# Patient Record
Sex: MALE | Race: WHITE | Employment: UNEMPLOYED
[De-identification: names, ages, dates, MRNs, and addresses within clinical notes are randomized per-mention and may not be internally consistent; named-entity substitution may affect disease eponyms.]

---

## 2023-05-31 ENCOUNTER — HOSPITAL ENCOUNTER (EMERGENCY)
Facility: HOSPITAL | Age: 28
Discharge: HOME | End: 2023-05-31
Attending: EMERGENCY MEDICINE

## 2023-05-31 VITALS
RESPIRATION RATE: 16 BRPM | OXYGEN SATURATION: 98 % | DIASTOLIC BLOOD PRESSURE: 72 MMHG | SYSTOLIC BLOOD PRESSURE: 116 MMHG | HEART RATE: 98 BPM | TEMPERATURE: 98.6 F

## 2023-05-31 DIAGNOSIS — F11.90 OPIATE USE: Primary | ICD-10-CM

## 2023-05-31 PROCEDURE — 99283 EMERGENCY DEPT VISIT LOW MDM: CPT

## 2023-05-31 NOTE — ED PROVIDER NOTES
Tenderness: There is no abdominal tenderness. Musculoskeletal:         General: Normal range of motion. Cervical back: Normal range of motion and neck supple. Skin:     General: Skin is warm and dry. Neurological:      General: No focal deficit present. Mental Status: He is alert. Psychiatric:      Comments: Alert and oriented except to the events of calling EMS, neurologically intact. Diagnostic Study Results   Labs -  No results found for this or any previous visit (from the past 24 hour(s)). Radiologic Studies -   No orders to display     [unfilled]    Medications ordered:   Medications - No data to display      Medical Decision Making   Initial Medical Decision Making and DDx:  At the time my evaluation respirations are 14 and sat is 97% on room air. He has not required any interventions. He is not obtunded or encephalopathic. No cause to hold him against his will and he is asking for discharge at this point so he will be discharged. Declines Narcan. Do not suspect intracranial emergency such as stroke or hemorrhage. ED Course: Progress Notes, Reevaluation, and Consults:         I am the first provider for this patient. I reviewed the vital signs, available nursing notes, past medical history, past surgical history, family history and social history. Patient Vitals for the past 12 hrs:   Temp Pulse Resp BP SpO2   05/31/23 1916 -- -- 16 -- 98 %   05/31/23 1850 98.6 °F (37 °C) 98 10 116/72 (!) 88 %       Vital Signs-Reviewed the patient's vital signs. Pulse Oximetry Analysis, Cardiac Monitor, 12 lead ekg:      Interpreted by the EP. Records Reviewed: Nursing notes reviewed (Time of Review: 7:20 PM)    Procedures:   Critical Care Time: 0  If critical care time is note it is exclusive of any separately billable procedures. Aspirin: (was aspirin given for stroke?)    Diagnosis     Clinical Impression:   1.  Opiate use        Disposition: DISPOSITION Decision To

## 2023-05-31 NOTE — ED TRIAGE NOTES
Pt arrived via EMS after taking a percocet given to him at a gas station. Pt breathing 10 respirations a minute.

## 2023-06-05 ENCOUNTER — HOSPITAL ENCOUNTER (INPATIENT)
Facility: HOSPITAL | Age: 28
LOS: 3 days | Discharge: HOME | DRG: 751 | End: 2023-06-09
Attending: EMERGENCY MEDICINE | Admitting: PSYCHIATRY & NEUROLOGY
Payer: MEDICAID

## 2023-06-05 DIAGNOSIS — R45.851 SUICIDAL IDEATION: Primary | ICD-10-CM

## 2023-06-05 LAB
AMPHET UR QL SCN: NEGATIVE
ANION GAP SERPL CALC-SCNC: 5 MMOL/L (ref 3–18)
APPEARANCE UR: CLEAR
BARBITURATES UR QL SCN: NEGATIVE
BASOPHILS # BLD: 0 K/UL (ref 0–0.1)
BASOPHILS NFR BLD: 0 % (ref 0–2)
BENZODIAZ UR QL: NEGATIVE
BILIRUB UR QL: NEGATIVE
BUN SERPL-MCNC: 8 MG/DL (ref 7–18)
BUN/CREAT SERPL: 9 (ref 12–20)
CALCIUM SERPL-MCNC: 10 MG/DL (ref 8.5–10.1)
CANNABINOIDS UR QL SCN: POSITIVE
CHLORIDE SERPL-SCNC: 102 MMOL/L (ref 100–111)
CO2 SERPL-SCNC: 30 MMOL/L (ref 21–32)
COCAINE UR QL SCN: POSITIVE
COLOR UR: YELLOW
CREAT SERPL-MCNC: 0.89 MG/DL (ref 0.6–1.3)
DIFFERENTIAL METHOD BLD: NORMAL
EOSINOPHIL # BLD: 0 K/UL (ref 0–0.4)
EOSINOPHIL NFR BLD: 0 % (ref 0–5)
ERYTHROCYTE [DISTWIDTH] IN BLOOD BY AUTOMATED COUNT: 12 % (ref 11.6–14.5)
ETHANOL SERPL-MCNC: <3 MG/DL (ref 0–3)
FLUAV RNA SPEC QL NAA+PROBE: NOT DETECTED
FLUBV RNA SPEC QL NAA+PROBE: NOT DETECTED
GLUCOSE SERPL-MCNC: 99 MG/DL (ref 74–99)
GLUCOSE UR STRIP.AUTO-MCNC: NEGATIVE MG/DL
HCT VFR BLD AUTO: 47.6 % (ref 36–48)
HGB BLD-MCNC: 15.9 G/DL (ref 13–16)
HGB UR QL STRIP: NEGATIVE
IMM GRANULOCYTES # BLD AUTO: 0 K/UL (ref 0–0.04)
IMM GRANULOCYTES NFR BLD AUTO: 0 % (ref 0–0.5)
KETONES UR QL STRIP.AUTO: ABNORMAL MG/DL
LEUKOCYTE ESTERASE UR QL STRIP.AUTO: NEGATIVE
LYMPHOCYTES # BLD: 2.1 K/UL (ref 0.9–3.6)
LYMPHOCYTES NFR BLD: 28 % (ref 21–52)
Lab: ABNORMAL
MCH RBC QN AUTO: 31.2 PG (ref 24–34)
MCHC RBC AUTO-ENTMCNC: 33.4 G/DL (ref 31–37)
MCV RBC AUTO: 93.3 FL (ref 78–100)
METHADONE UR QL: NEGATIVE
MONOCYTES # BLD: 0.5 K/UL (ref 0.05–1.2)
MONOCYTES NFR BLD: 7 % (ref 3–10)
NEUTS SEG # BLD: 4.9 K/UL (ref 1.8–8)
NEUTS SEG NFR BLD: 65 % (ref 40–73)
NITRITE UR QL STRIP.AUTO: NEGATIVE
NRBC # BLD: 0 K/UL (ref 0–0.01)
NRBC BLD-RTO: 0 PER 100 WBC
OPIATES UR QL: NEGATIVE
PCP UR QL: NEGATIVE
PH UR STRIP: 8 (ref 5–8)
PLATELET # BLD AUTO: 360 K/UL (ref 135–420)
PMV BLD AUTO: 10.1 FL (ref 9.2–11.8)
POTASSIUM SERPL-SCNC: 3.6 MMOL/L (ref 3.5–5.5)
PROT UR STRIP-MCNC: NEGATIVE MG/DL
RBC # BLD AUTO: 5.1 M/UL (ref 4.35–5.65)
SARS-COV-2 RNA RESP QL NAA+PROBE: NOT DETECTED
SODIUM SERPL-SCNC: 137 MMOL/L (ref 136–145)
SP GR UR REFRACTOMETRY: 1.02 (ref 1–1.03)
UROBILINOGEN UR QL STRIP.AUTO: 0.2 EU/DL (ref 0.2–1)
WBC # BLD AUTO: 7.6 K/UL (ref 4.6–13.2)

## 2023-06-05 PROCEDURE — 85025 COMPLETE CBC W/AUTO DIFF WBC: CPT

## 2023-06-05 PROCEDURE — 80048 BASIC METABOLIC PNL TOTAL CA: CPT

## 2023-06-05 PROCEDURE — 82077 ASSAY SPEC XCP UR&BREATH IA: CPT

## 2023-06-05 PROCEDURE — 81003 URINALYSIS AUTO W/O SCOPE: CPT

## 2023-06-05 PROCEDURE — 99285 EMERGENCY DEPT VISIT HI MDM: CPT

## 2023-06-05 PROCEDURE — 87636 SARSCOV2 & INF A&B AMP PRB: CPT

## 2023-06-05 PROCEDURE — 80307 DRUG TEST PRSMV CHEM ANLYZR: CPT

## 2023-06-05 ASSESSMENT — PAIN - FUNCTIONAL ASSESSMENT: PAIN_FUNCTIONAL_ASSESSMENT: NONE - DENIES PAIN

## 2023-06-05 ASSESSMENT — ENCOUNTER SYMPTOMS
RESPIRATORY NEGATIVE: 1
ALLERGIC/IMMUNOLOGIC NEGATIVE: 1
EYES NEGATIVE: 1
GASTROINTESTINAL NEGATIVE: 1

## 2023-06-06 PROBLEM — F32.A DEPRESSION: Status: ACTIVE | Noted: 2023-06-06

## 2023-06-06 PROBLEM — F98.8 ADD (ATTENTION DEFICIT DISORDER) WITHOUT HYPERACTIVITY: Status: ACTIVE | Noted: 2023-06-06

## 2023-06-06 PROBLEM — F32.A DEPRESSION: Status: RESOLVED | Noted: 2023-06-06 | Resolved: 2023-06-06

## 2023-06-06 PROBLEM — F33.2 SEVERE RECURRENT MAJOR DEPRESSION WITHOUT PSYCHOTIC FEATURES (HCC): Status: ACTIVE | Noted: 2023-06-06

## 2023-06-06 LAB
EKG ATRIAL RATE: 64 BPM
EKG DIAGNOSIS: NORMAL
EKG P AXIS: 41 DEGREES
EKG P-R INTERVAL: 130 MS
EKG Q-T INTERVAL: 394 MS
EKG QRS DURATION: 96 MS
EKG QTC CALCULATION (BAZETT): 406 MS
EKG R AXIS: 32 DEGREES
EKG T AXIS: 14 DEGREES
EKG VENTRICULAR RATE: 64 BPM

## 2023-06-06 PROCEDURE — 6370000000 HC RX 637 (ALT 250 FOR IP): Performed by: PSYCHIATRY & NEUROLOGY

## 2023-06-06 PROCEDURE — 1240000000 HC EMOTIONAL WELLNESS R&B

## 2023-06-06 PROCEDURE — 99222 1ST HOSP IP/OBS MODERATE 55: CPT | Performed by: PSYCHIATRY & NEUROLOGY

## 2023-06-06 PROCEDURE — 93005 ELECTROCARDIOGRAM TRACING: CPT | Performed by: EMERGENCY MEDICINE

## 2023-06-06 PROCEDURE — 93010 ELECTROCARDIOGRAM REPORT: CPT | Performed by: INTERNAL MEDICINE

## 2023-06-06 RX ORDER — TRAZODONE HYDROCHLORIDE 50 MG/1
50 TABLET ORAL NIGHTLY PRN
Status: DISCONTINUED | OUTPATIENT
Start: 2023-06-06 | End: 2023-06-09 | Stop reason: HOSPADM

## 2023-06-06 RX ORDER — HYDROXYZINE PAMOATE 50 MG/1
50 CAPSULE ORAL 3 TIMES DAILY PRN
Status: DISCONTINUED | OUTPATIENT
Start: 2023-06-06 | End: 2023-06-09 | Stop reason: HOSPADM

## 2023-06-06 RX ORDER — BUPROPION HYDROCHLORIDE 150 MG/1
150 TABLET ORAL DAILY
Status: DISCONTINUED | OUTPATIENT
Start: 2023-06-07 | End: 2023-06-08

## 2023-06-06 RX ORDER — BENZTROPINE MESYLATE 1 MG/ML
1 INJECTION INTRAMUSCULAR; INTRAVENOUS 2 TIMES DAILY PRN
Status: CANCELLED | OUTPATIENT
Start: 2023-06-06

## 2023-06-06 RX ORDER — BUPROPION HYDROCHLORIDE 150 MG/1
300 TABLET ORAL DAILY
Status: DISCONTINUED | OUTPATIENT
Start: 2023-06-06 | End: 2023-06-06

## 2023-06-06 RX ORDER — HALOPERIDOL 5 MG/1
5 TABLET ORAL EVERY 6 HOURS PRN
Status: CANCELLED | OUTPATIENT
Start: 2023-06-06

## 2023-06-06 RX ORDER — LORAZEPAM 2 MG/ML
INJECTION INTRAMUSCULAR EVERY 6 HOURS PRN
Status: CANCELLED | OUTPATIENT
Start: 2023-06-06

## 2023-06-06 RX ORDER — LORAZEPAM 0.5 MG/1
0.5 TABLET ORAL EVERY 4 HOURS PRN
Status: CANCELLED | OUTPATIENT
Start: 2023-06-06

## 2023-06-06 RX ORDER — BENZTROPINE MESYLATE 1 MG/1
1 TABLET ORAL 2 TIMES DAILY
Status: CANCELLED | OUTPATIENT
Start: 2023-06-06

## 2023-06-06 RX ORDER — HALOPERIDOL 5 MG/ML
INJECTION INTRAMUSCULAR EVERY 6 HOURS PRN
Status: CANCELLED | OUTPATIENT
Start: 2023-06-06

## 2023-06-06 RX ADMIN — BUPROPION HYDROCHLORIDE 300 MG: 150 TABLET, FILM COATED, EXTENDED RELEASE ORAL at 09:07

## 2023-06-06 RX ADMIN — HYDROXYZINE PAMOATE 50 MG: 50 CAPSULE ORAL at 03:42

## 2023-06-06 RX ADMIN — TRAZODONE HYDROCHLORIDE 50 MG: 50 TABLET ORAL at 20:38

## 2023-06-06 ASSESSMENT — SLEEP AND FATIGUE QUESTIONNAIRES
DO YOU HAVE DIFFICULTY SLEEPING: YES
AVERAGE NUMBER OF SLEEP HOURS: 6
DO YOU USE A SLEEP AID: YES
SLEEP PATTERN: DIFFICULTY FALLING ASLEEP

## 2023-06-06 ASSESSMENT — LIFESTYLE VARIABLES
HOW MANY STANDARD DRINKS CONTAINING ALCOHOL DO YOU HAVE ON A TYPICAL DAY: PATIENT DOES NOT DRINK
HOW OFTEN DO YOU HAVE A DRINK CONTAINING ALCOHOL: NEVER

## 2023-06-06 NOTE — H&P
Deferred. AXIS III:  Remote history of intravenous drug use. TREATMENT PLAN:  1. The patient was admitted to the ann, a locked unit here in the behavioral program, will be seen daily and will be referred to the groups within context of the program.  2.  The patient will be prescribed with a combination of medications which will include bupropion  mg initially for 3 or 4 days, then to be increased to 300 mg daily which is his previously prescribed dose. 3.  The patient will be prescribed with Vistaril for agitation or anxiety. 4.  From a treatment point of view, the patient will be considered for a referral to outpatient treatment. We will possibly consider also referral for an inpatient rehab program.  However, it depends upon how the patient does during this admission. ESTIMATED LENGTH OF STAY AND PROGNOSIS:  ELOS is 5-7 days. Prognosis will entirely depend upon the patient's treatment compliance. Shahla Juarez MD, LFAPA      [id number]  D:  06/06/2023 11:52  T:  06/06/2023 13:33  JOB #:  [id number]    Behavioral Services  Medicare Certification Upon Admission    I certify that this patient's inpatient psychiatric hospital admission is medically necessary for:      [x] Treatment which could reasonably be expected to improve this patient's condition,       [] For diagnostic study;     AND     [x] The inpatient psychiatric services are provided while the individual is under the care of a physician and are included in the individualized plan of care. Estimated length of stay/service is 5 days. Plan for post-hospital care sill include further OP psychiatric and medical care.     Electronically signed by Shahla Juarez MD on 6/7/2023 at 8:37 AM

## 2023-06-06 NOTE — ED TRIAGE NOTES
Pt very positive attitude. Stated he has been an IV drug user for 15 years, showed tech where to get blood work and was positive the entire time.

## 2023-06-06 NOTE — GROUP NOTE
Art Therapy Group Progress Note    PATIENT SCHEDULED FOR GROUP AT:  2:00 PM    GROUP STOP TIME:  3:00 PM    ATTENDANCE: Low (3/5) participants     TOPIC / FOCUS: Draw an Emotion as a Character     GOALS:  Emotion Identification, modeling emotional communication skills, reflect on emotions and reactions to emotions, identify coping skills and provide alternative coping options     PARTICIPATION LEVEL: Lee 621  Art Therapist, MA

## 2023-06-06 NOTE — PLAN OF CARE
Problem: Discharge Planning  Goal: Discharge to home or other facility with appropriate resources  6/6/2023 1614 by Adriana Adorno RN  Outcome: Progressing     Problem: Self Harm/Suicidality  Goal: Will have no self-injury during hospital stay  Description: INTERVENTIONS:  1. Ensure constant observer at bedside with Q15M safety checks  2. Maintain a safe environment  3. Secure patient belongings  4. Ensure family/visitors adhere to safety recommendations  5. Ensure safety tray has been added to patient's diet order  6. Every shift and PRN: Re-assess suicidal risk via Frequent Screener    6/6/2023 1610 by Adriana Adorno RN  Outcome: Progressing     Problem: Safety - Adult  Goal: Free from fall injury  6/6/2023 1614 by Adriana Adorno RN  Outcome: Progressing   Patient presents with a flat affect and depressed mood. Patient denies SI/HI and AVH. Patient denies pain. Patient remained isolative to room his room this shift. Patient is encouraged to participate in unit activities. This writer will continue to provide a safe and therapeutic environment.

## 2023-06-06 NOTE — ED PROVIDER NOTES
Psychiatric:         Mood and Affect: Mood normal.         Behavior: Behavior normal.         Thought Content: Thought content normal.         Judgment: Judgment normal.       DIAGNOSTIC RESULTS   LABS:  Labs Reviewed   URINALYSIS - Abnormal; Notable for the following components:       Result Value    Ketones, Urine TRACE (*)     All other components within normal limits   URINE DRUG SCREEN - Abnormal; Notable for the following components:    THC, TH-Cannabinol, Urine Positive (*)     Cocaine, Urine Positive (*)     All other components within normal limits   BASIC METABOLIC PANEL - Abnormal; Notable for the following components:    Bun/Cre Ratio 9 (*)     All other components within normal limits   COVID-19 & INFLUENZA COMBO   CBC WITH AUTO DIFFERENTIAL   ETHANOL       All other labs were within normal range or not returned as of this dictation. EMERGENCY DEPARTMENT COURSE and DIFFERENTIAL DIAGNOSIS/MDM:   Vitals:    Vitals:    06/05/23 2153   BP: (!) 138/97   Pulse: 72   Resp: 16   Temp: 98.5 °F (36.9 °C)   TempSrc: Oral   SpO2: 98%   Weight: 165 lb (74.8 kg)   Height: 5' 8\" (1.727 m)       Is been medically cleared to present to crisis. During evaluation, patient states that he is suicidal to crisis worker and she is presenting to psychiatrist patient for admission. Medical Decision Making  Amount and/or Complexity of Data Reviewed  Labs: ordered. ECG/medicine tests: ordered. Risk  Decision regarding hospitalization. REASSESSMENT          FINAL IMPRESSION    No diagnosis found. DISPOSITION/PLAN   DISPOSITION        PATIENT REFERRED TO:  No follow-up provider specified. DISCHARGE MEDICATIONS:  New Prescriptions    No medications on file     Controlled Substances Monitoring:     No flowsheet data found.     (Please note that portions of this note were completed with a voice recognition program.  Efforts were made to edit the dictations but occasionally words are

## 2023-06-06 NOTE — CONSULTS
SUBJECTIVE:  Silvano De León is a 81 year old male who presents to the clinic for evaluation of cerumen impaction.    OBJECTIVE:  --Ear exam was done under the microscope.    Patient was placed in a supine position, and a microscope was then brought into the field. A speculum was placed in the right ear. Cerumen in the ear canal partially occluding the canal and unable to completely visualized ear canal and TM.  Using an alligator forcep, and a #5 suction tip, the cerumen was removed. In a similar fashion, a speculum was inserted into the left ear canal.  Cerumen in the ear canal partially occluding the canal and unable to completely visualized ear canal and TM.  The cerumen was removed using an alligator forcep, and a #5 suction tip.  Ears:    Right ear: EAC patent, tympanic membrane intact, no middle ear effusion, no otorrhea  Left ear: EAC patent, tympanic membrane intact, no middle ear effusion, no otorrhea    ASSESSMENT:  Cerumen impaction    PLAN:  -cerumen removed  -f/u as needed    Electronically signed by: Freddy Soria PA-C, 05/28/19    Supervising Physician: :Isiah Hamlin MD    
Negative NEG      Blood, Urine Negative NEG      Urobilinogen, Urine 0.2 0.2 - 1.0 EU/dL    Nitrite, Urine Negative NEG      Leukocyte Esterase, Urine Negative NEG     DRUG SCREEN MULTI URINE    Collection Time: 06/05/23 10:00 PM   Result Value Ref Range    Benzodiazepines, Urine Negative NEG      Barbiturates, Urine Negative NEG      THC, TH-Cannabinol, Urine Positive (A) NEG      Opiates, Urine Negative NEG      PCP, Urine Negative NEG      Cocaine, Urine Positive (A) NEG      Amphetamine, Urine Negative NEG      Methadone, Urine Negative NEG      Comments: (NOTE)    CBC with Auto Differential    Collection Time: 06/05/23 10:05 PM   Result Value Ref Range    WBC 7.6 4.6 - 13.2 K/uL    RBC 5.10 4.35 - 5.65 M/uL    Hemoglobin 15.9 13.0 - 16.0 g/dL    Hematocrit 47.6 36.0 - 48.0 %    MCV 93.3 78.0 - 100.0 FL    MCH 31.2 24.0 - 34.0 PG    MCHC 33.4 31.0 - 37.0 g/dL    RDW 12.0 11.6 - 14.5 %    Platelets 277 245 - 443 K/uL    MPV 10.1 9.2 - 11.8 FL    Nucleated RBCs 0.0 0  WBC    nRBC 0.00 0.00 - 0.01 K/uL    Neutrophils % 65 40 - 73 %    Lymphocytes % 28 21 - 52 %    Monocytes % 7 3 - 10 %    Eosinophils % 0 0 - 5 %    Basophils % 0 0 - 2 %    Immature Granulocytes 0 0.0 - 0.5 %    Neutrophils Absolute 4.9 1.8 - 8.0 K/UL    Lymphocytes Absolute 2.1 0.9 - 3.6 K/UL    Monocytes Absolute 0.5 0.05 - 1.2 K/UL    Eosinophils Absolute 0.0 0.0 - 0.4 K/UL    Basophils Absolute 0.0 0.0 - 0.1 K/UL    Absolute Immature Granulocyte 0.0 0.00 - 0.04 K/UL    Differential Type AUTOMATED     Ethanol    Collection Time: 06/05/23 10:05 PM   Result Value Ref Range    Ethanol Lvl <3 0 - 3 MG/DL   Basic Metabolic Panel    Collection Time: 06/05/23 10:05 PM   Result Value Ref Range    Sodium 137 136 - 145 mmol/L    Potassium 3.6 3.5 - 5.5 mmol/L    Chloride 102 100 - 111 mmol/L    CO2 30 21 - 32 mmol/L    Anion Gap 5 3.0 - 18 mmol/L    Glucose 99 74 - 99 mg/dL    BUN 8 7.0 - 18 MG/DL    Creatinine 0.89 0.6 - 1.3 MG/DL    Bun/Cre Ratio

## 2023-06-06 NOTE — GROUP NOTE
Group Therapy Note    Date: 6/6/2023    Group Start Time: 1500  Group End Time: 3125  Group Topic: Recreational      Shreyas Diallo        Group Therapy Note  Attendees/Participation Level: Declined         Group: Table Game/ Trouble      The focus of the group is to release positive endorphins and improve mood.            Signature:  Shar Khan

## 2023-06-06 NOTE — GROUP NOTE
Art Therapy Group Progress Note    PATIENT SCHEDULED FOR GROUP AT: 10:20 AM     GROUP STOP TIME:  11:20 AM    ATTENDANCE:  Low (5/13 participants)     TOPIC / FOCUS: Finish the Image     GOALS:  to practice observation and grounding techniques, practice mindfulness, decrease stress and anxiety, increase reality orientation      PARTICIPATION LEVEL: interactive, attended group, participated in art making, contributed to group discussion     PARTICIPATION QUALITY: appropriate, lethargic - pt stated he had a late night in the ED and admission had impacted his sleep. ATTENTION LEVEL:  invested in coloring     LEVEL OF CONCIOUSNESS: alert, oriented, attentive     SPEECH: normal     THOUGHT PROCESS/ CONTENT:  linear     AFFECTIVE FUNCTIONING: flat     MOOD: depressed, tired     SYMBOLIC & THEMATIC CONTENT AS NOTED IN IMAGERY:  Pt presented with low frustration tolerance. PT chose a shoe to finish the image, but after a minute changed his mind and chose an easier option to complete, a rainbow. The PT's rainbow art lacked color and was created with low energy. Pt artwork congruent with depressed, lethargic presentation. The Art Therapist encouraged the PT to attempt the shoe image. The PT did not attempt to finish the shoe image, but he did color it. The PT was focused and invested in coloring the shoe. Pt coloring style suggested the Pt is experiencing anxiety. STATUS AFTER INTERVENTION: improved, feeling relaxed and focused.      RESPONSE TO LEARNING: able to verbalize current knowledge/ experience    ENDINGS: none reported     Levon More  Art Therapist, MA

## 2023-06-06 NOTE — PLAN OF CARE
Problem: Discharge Planning  Goal: Discharge to home or other facility with appropriate resources  6/6/2023 0414 by Rick Garces RN  Outcome: Progressing  6/6/2023 0414 by Rick Garces RN  Outcome: Progressing  6/6/2023 0413 by Rick Garces RN  Outcome: Progressing  6/6/2023 0412 by Rick Garces RN  Outcome: Progressing     Problem: Self Harm/Suicidality  Goal: Will have no self-injury during hospital stay  6/6/2023 0414 by Rick Garces RN  Outcome: Progressing  6/6/2023 0414 by Rick Garces RN  Outcome: Progressing  6/6/2023 0413 by Rick Garces RN  Outcome: Progressing  6/6/2023 0412 by Rick Garces RN  Flowsheets (Taken 6/6/2023 4240)  Will have no self-injury during hospital stay:   Maintain a safe environment   Secure patient belongings   Every shift and PRN: Re-assess suicidal risk via Frequent Screener

## 2023-06-07 PROCEDURE — 99232 SBSQ HOSP IP/OBS MODERATE 35: CPT | Performed by: PSYCHIATRY & NEUROLOGY

## 2023-06-07 PROCEDURE — 6370000000 HC RX 637 (ALT 250 FOR IP): Performed by: PSYCHIATRY & NEUROLOGY

## 2023-06-07 PROCEDURE — 1240000000 HC EMOTIONAL WELLNESS R&B

## 2023-06-07 RX ORDER — NICOTINE 21 MG/24HR
1 PATCH, TRANSDERMAL 24 HOURS TRANSDERMAL DAILY
Status: DISCONTINUED | OUTPATIENT
Start: 2023-06-07 | End: 2023-06-09 | Stop reason: HOSPADM

## 2023-06-07 RX ADMIN — TRAZODONE HYDROCHLORIDE 50 MG: 50 TABLET ORAL at 20:13

## 2023-06-07 RX ADMIN — BUPROPION HYDROCHLORIDE 150 MG: 150 TABLET, EXTENDED RELEASE ORAL at 08:48

## 2023-06-07 RX ADMIN — HYDROXYZINE PAMOATE 50 MG: 50 CAPSULE ORAL at 17:41

## 2023-06-07 NOTE — BH NOTE
Bedside and Verbal shift change report given to this writer (oncoming nurse) by Marni Lovell RN (offgoing nurse). Report included the following information MAR and Recent Results. Pt came out of the bathroom during the initial rounding. He is alert and oriented to x3. No concern is noted at this time; we will continue with the 15-minute rounding per protocol.

## 2023-06-07 NOTE — BH NOTE
Destiny Sprague was paged regarding the PT request for nicotine patch; on call, MD returned the call immediately and updated with the PT request and order received. Pt is visible in the millieu watching TV with peers. He is alert, oriented to his own abilities, and pleasant upon approach; there are no significant changes from yesterday. He denies any intention of harming himself or others.  Will monitor

## 2023-06-07 NOTE — GROUP NOTE
Art Therapy Group Progress Note    PATIENT SCHEDULED FOR GROUP AT: 1:00 PM    GROUP STOP TIME:  2:00 PM     ATTENDANCE: Moderate (5/10 participants)     TOPIC / FOCUS: I AM' statement Thumbprint Drawing     GOALS:  focus attention, practice mindfulness, practice effective communication, explore identity      PARTICIPATION LEVEL:  minimal, attended group, participated in art making, cooperative, followed directions     PARTICIPATION QUALITY: distracted, socializing     ATTENTION LEVEL: not invested    LEVEL OF CONCIOUSNESS: alert, oriented     SPEECH: normal     THOUGHT PROCESS/ CONTENT: linear     AFFECTIVE FUNCTIONING: flat     MOOD: depressed     SYMBOLIC & THEMATIC CONTENT AS NOTED IN IMAGERY:  PT was able to identify 3 \"I Am\" statements including \"I am Proud\", \"I am Positive\", and \"I am a . \" Pt was encourage to add more I am statements or use color, symbols, or shapes to add more information into his work. PT artwork was low energy. Pt was distracted and socializing.      STATUS AFTER INTERVENTION: unchanged     RESPONSE TO LEARNING: resistant     ENDINGS: none reported     MODES OF INTERVENTION: education, socialization, media    DISIPLINE RESPONSIBLE: Art Therapist     Ela Zavaleta  Art Therapist, MA

## 2023-06-07 NOTE — BH NOTE
Patient appears to have slept for approximately 7 hours. Staff will continue to monitor and document any changes in behavior.

## 2023-06-07 NOTE — GROUP NOTE
Group Therapy Note    Date: 6/7/2023    Group Start Time: 1500  Group End Time: 3264  Group Topic: Recreational        Shreyas Diallo        Group Therapy Note    Attendees: 6    Group Type:  Game/  Emotion Word Scramble and Coping Word Scramble         The focus of the game/activity is to help the patients understand emotions, redirect thoughts to happy/fun moments, and discussing different ways to cope. Notes: The facilitator engaged with PT's a word game. While unscrambling the words for emotions, the facilitator would ask the patients different questions to help think about the emotions while also allowing the PT's to explain a time the emotion was felt. While unscrambling coping words PT's expressed the different leisure activities that would help some cope. Facilitator would also get PT's to come up with words for peers to unscramble. Status After Intervention:  Unchanged    Participation Level:  Active Listener and Interactive     Participation Quality: Appropriate, Attentive, and Sharing      Speech:  normal      Thought Process/Content: Logical      Affective Functioning: Congruent      Mood: interests, laughing, smiling      Level of consciousness:  Alert and Attentive      Response to Learning: Able to verbalize current knowledge/experience and Able to verbalize/acknowledge new learning      Endings: None Reported    Modes of Intervention: Socialization and Problem-solving      Discipline Responsible: Recreational Therapist    Clark Miller  Recreational therapist

## 2023-06-07 NOTE — GROUP NOTE
Group Therapy Note    Date: 6/7/2023    Group Start Time: 1400  Group End Time: 0693  Group Topic: Psychoeducation    [facility] ADULT CHEM [address] [facility], BSW        Group Therapy Note    Attendees: 8    Notes: The focus of this group was to identify and promote life skills. The goal of the group was to understand the value of money, how we use it, and to identify strategies on how to save money. Participants were encouraged to look at money as a tool to successful living. Each participant created a savings and spending guide to help with their monthly spending. Status After Intervention:  Improved    Participation Level:  Active Listener and Interactive    Participation Quality: Appropriate, Attentive, Sharing, and Supportive      Speech:  normal      Thought Process/Content: Logical      Affective Functioning: Congruent    Level of consciousness:  Alert      Response to Learning: Able to verbalize current knowledge/experience, Able to verbalize/acknowledge new learning, Able to retain information, and Capable of insight      Endings: None Reported    Modes of Intervention: Education, Support, Socialization, and Problem-solving      Discipline Responsible: BSW      Signature:  LUZ ELENA Church

## 2023-06-07 NOTE — BH NOTE
06/06/23 7p-7a  Patient visible on unit. Isolative to self. Denies SI/HI/AVH. Endorses depression and mild anxiety, Denies withdrawal symptoms. No complaints of pain. Q15MIN safety checks  maintained.

## 2023-06-08 PROCEDURE — 6370000000 HC RX 637 (ALT 250 FOR IP): Performed by: PSYCHIATRY & NEUROLOGY

## 2023-06-08 PROCEDURE — 1240000000 HC EMOTIONAL WELLNESS R&B

## 2023-06-08 RX ORDER — BUPROPION HYDROCHLORIDE 150 MG/1
300 TABLET ORAL DAILY
Status: DISCONTINUED | OUTPATIENT
Start: 2023-06-09 | End: 2023-06-09 | Stop reason: HOSPADM

## 2023-06-08 RX ADMIN — HYDROXYZINE PAMOATE 50 MG: 50 CAPSULE ORAL at 10:41

## 2023-06-08 RX ADMIN — BUPROPION HYDROCHLORIDE 150 MG: 150 TABLET, EXTENDED RELEASE ORAL at 08:09

## 2023-06-08 RX ADMIN — HYDROXYZINE PAMOATE 50 MG: 50 CAPSULE ORAL at 16:26

## 2023-06-08 RX ADMIN — TRAZODONE HYDROCHLORIDE 50 MG: 50 TABLET ORAL at 20:05

## 2023-06-08 NOTE — GROUP NOTE
Group Therapy Note     Date: 6/8/2023     Group Start Time: 0730  Group End Time: 0740  Group Topic: Community Meeting      [facility] [address], RN        Group Therapy Note     Attendees: 6        Patient's Goal: start up day and re-orientation to unit rules and regulations     Notes:  Pt attended group, was calm and cooperative. Status After Intervention:  Improved     Participation Level:  Active Listener     Participation Quality: Appropriate        Speech:  n/a        Thought Process/Content: Logical        Affective Functioning: Congruent        Mood: euthymic        Level of consciousness:  Alert and Oriented x4        Response to Learning: Capable of insight        Endings: None Reported     Modes of Intervention: Education        Discipline Responsible: Registered Nurse        Signature:  Linus Morales RN

## 2023-06-08 NOTE — GROUP NOTE
Group Therapy Note    Date: 6/8/2023    Group Start Time: 8150  Group End Time: 3461  Group Topic: Recreational        Shreyas Diallo        Group Therapy Note    Attendees: 9  Group: Juan Luis    The focus of this social group is to positive impact on a person's health and wellbeing, including decreasing stress, depression, and anxiety         Status After Intervention:  Unchanged    Participation Level:  Active Listener and Interactive    Participation Quality: Appropriate and Attentive      Speech:  normal      Thought Process/Content: Logical      Affective Functioning: Congruent      Mood: Excited/ Happy      Level of consciousness:  Alert and Attentive          Modes of Intervention: Activity      Discipline Responsible: Recreational Therapist      Signature:  Chris Salmeron

## 2023-06-08 NOTE — BH NOTE
PT appeared to have slept 9hrs thus far, and is currently still sleeping. Will continue to monitor for any changes.

## 2023-06-08 NOTE — PLAN OF CARE
Problem: Self Harm/Suicidality  Goal: Will have no self-injury during hospital stay  Description: INTERVENTIONS:  1. Ensure constant observer at bedside with Q15M safety checks  2. Maintain a safe environment  3. Secure patient belongings  4. Ensure family/visitors adhere to safety recommendations  5. Ensure safety tray has been added to patient's diet order  6. Every shift and PRN: Re-assess suicidal risk via Frequent Screener    Outcome: Progressing    Patient received this am alert and verbal no c/o pain, took am medications, patient stated, Ruth Dalton was feeling better and that the medications are helping him,\" denies SI, HI or auditory/visual hallucinations, patient attended social group this am, will continue to monitor patient.

## 2023-06-08 NOTE — BSMART NOTE
Behavioral Health Crisis Assessment    Chief Complaint: \"I need to get back on my medicine. I'm having suicidal thoughts and feeling depressed. \"     Voluntary or Involuntary Status : Voluntary    C-SSRS current suicide Risk (High, Moderate, Low): Low     Past Suicide Attempts:  (specify): Patient denied past suicide attempts. Self Injurious/Self Mutilation behaviors (specify): Patient denied self-injurious behavior. Protective Factors (specify): \"Supportive family members, I got my freedom. mother Castellon, [phone]. \"    Risk Factors (specify): Suicidal thoughts, no outpatient resources, substance abuse. Substance use (current or past) : (see below)    Alcohol: Patient denied use. Heroin:  Date started: \"Age 14\"  Frequency: \"I haven't used heroin since 2017. \"  Amount:\"2-3 bundles\"  Route: \"IV\"  Last use: \"2017\"  Withdrawals: \"None\"  Rehab/Inpatient Services: \"[facility] [address] [facility]\"    Cocaine:   Date started: \"Age 14\"  Route: \"smoked\"  Frequency: \"relapsed after being clean 2-3 years\"\"  Amount: \"$10-$20 worth\"  Last use: \"2 days, ago\"  Withdrawals: \"None\"  Rehab/Inpatient Services: \"[facility]\"    Marijuana:  Date started: \"Age 11\"  Frequency: \"all the time\"  Amount: \"2-3 bowls\"  Last use: \"yesterday\"  Withdrawals: \"none\"  Rehab/Inpatient Services:\"none\"    Prescription/OTC Medications: Patient denied current use. \"Years ago, I used to do Dilaudid, hydrocodone, OxyContin, and Percocet. \"    Hallucinogenics: Patient denied use. Cigarettes/Cigars: \"I vape nicotine. \"    MH & Substance use Treatment  (current and/or past): \"[facility], [facility], no outpatient [facility] services\"    Medications: \"Wellbutrin 300 mg daily, Vistaril 50 mg in the morning and at night. \"    Violence towards others (current and/or past:(specify): Patient denied.     Legal Issues (current or past}: Patient stated that he has
Pt is a 70-year-old male with history of Major depressive disorder, recurrent, without psychotic symptoms. Substance use disorder including opioids, currently in remission; cocaine - moderate; cannabis - moderate. Pt. Was admitted for depression and suicidal ideations. P.t's case was discussed in staffing. Pt is making progress in treatment. Pt will be dc tomorrow. SW Contact:  SW met with the pt.  to discuss dc planning. \" I feel better\". Pt endorsed not having ideations to harm self and others. SW discussed safety plan,continued treatment compliance,relapse prevention and aftercare. Pt. Denies ideations and hallucinations. Pt. Plans to follow up outpt with [facility]. Pt plan to return to his home address. Pt's insight and mood have improved. SW will continue to provide pt with support towards dc planning.        Pauline Herrmann MA,LMHP-R
Pt is a 70-year-old male with history of Major depressive disorder, recurrent, without psychotic symptoms. Substance use disorder including opioids, currently in remission; cocaine - moderate; cannabis - moderate. Pt. Was admitted for depression and suicidal ideations. Pt.s' case was discussed in treatment team and staffing. Pt is making progress. Pt. was provided medication education. Pt was encouraged to complete his lab work. Pt. Is depressed and mood and insight are starting to improve. The team will continue to engage pt with safety plan, encouraged group attendance and assist with dc planning. Pt  denies ideations and hallucinations. Pt. plans at dc to return to his current home. SW will continue to provide pt with support towards dc planning.          Doc Jose Miguel ERIC,LMJOSE LUIS-R
SOCIAL WORK GROUP THERAPY PROGRESS NOTE    Group Time:  10:15am       Group Topic:  Coping Skills    C D Issues    Group Participation:      Pt moderately involved during group discussion but remained attentive. Not as dysphoric & still seems committed to tx program for CD Issues. Explored \"my body's\" anger warning signs as well as common triggers. Active discussing triggers that impact his moods.     Differences between Assertive, Aggressive & Non-Assertive Behaviors
Social Work Group  6/6/23   Communication Styles Group     Attendance  refused   participate     Number of participants  8   Time in  12:00pm    Time out  12:45pm    Total Time  45 mins    Interaction  absent   Interventions/techniques  Informed and encouraged, Provided positive feedback and support        BONY Aggarwal  
Social Work Group  6/7/23    Communication Styles Group     Attendance  Actively   participate     Number of participants  5   Time in  12:00pm    Time out  12:45pm    Total Time  45 mins    Interaction  Present   Interventions/techniques  Informed and encouraged, Provided positive feedback and support       BONY Metzger  
Social Work Group  6/8/23    Communication Styles Group     Attendance  Actively   participate     Number of participants  6   Time in  12:00pm    Time out  12:45pm    Total Time  45 mins    Interaction  Present   Interventions/techniques  Informed and encouraged, Provided positive feedback and support       BONY Lyn  
use.    Plan of Care: Pt was encouraged to participate in the following activities:  medication management, group/individual therapies, family meetings, psychoeducation, treatment team meetings to assist with stabilization    Initial Discharge Plan:  3-5 days    Clinical Summary: Pt is a 29-year-old male with history of Major depressive disorder, recurrent, without psychotic symptoms. Substance use disorder including opioids, currently in remission; cocaine - moderate; cannabis - moderate. SW met with pt to discuss admission. Pt reports to feeling depressed suicidal due to being off medications. Pt stated he started feeling depressed and had thoughts to harm self. Pt denies ideations of self harm today. Pt admitted relapsing on cocaine. SW discussed relapse prevention to include IOP, NA groups and  SA residential rehab. Pt. has declined rehab programs. Pt wants to get stable on medications. Sw discussed self-efficacy, safety plan and aftercare. Sw will continue to provide pt with support towards dc planning.     Dilcia Bullock MA, LMHP-R

## 2023-06-09 VITALS
OXYGEN SATURATION: 100 % | RESPIRATION RATE: 17 BRPM | SYSTOLIC BLOOD PRESSURE: 113 MMHG | HEART RATE: 80 BPM | TEMPERATURE: 97.1 F | DIASTOLIC BLOOD PRESSURE: 81 MMHG | WEIGHT: 165 LBS | HEIGHT: 68 IN | BODY MASS INDEX: 25.01 KG/M2

## 2023-06-09 PROCEDURE — 6370000000 HC RX 637 (ALT 250 FOR IP): Performed by: PSYCHIATRY & NEUROLOGY

## 2023-06-09 RX ORDER — BUPROPION HYDROCHLORIDE 300 MG/1
300 TABLET ORAL DAILY
Qty: 15 TABLET | Refills: 1 | Status: SHIPPED | OUTPATIENT
Start: 2023-06-10

## 2023-06-09 RX ORDER — HYDROXYZINE PAMOATE 50 MG/1
50 CAPSULE ORAL EVERY 6 HOURS PRN
Qty: 30 CAPSULE | Refills: 0 | Status: SHIPPED | OUTPATIENT
Start: 2023-06-09

## 2023-06-09 RX ORDER — TRAZODONE HYDROCHLORIDE 50 MG/1
50 TABLET ORAL NIGHTLY PRN
Qty: 15 TABLET | Refills: 1 | Status: SHIPPED | OUTPATIENT
Start: 2023-06-09

## 2023-06-09 RX ADMIN — BUPROPION HYDROCHLORIDE 300 MG: 150 TABLET, FILM COATED, EXTENDED RELEASE ORAL at 08:29

## 2023-06-09 RX ADMIN — HYDROXYZINE PAMOATE 50 MG: 50 CAPSULE ORAL at 00:57

## 2023-06-09 NOTE — GROUP NOTE
Group Therapy Note    Date: 6/9/2023    Group Start Time: 0930  Group End Time: 9710  Group Topic: Recreational    [facility] ADULT CHEM DEP    Shreyas Diallo        Group Therapy Note    Attendees: 9      Group Type: Exercise/Yoga    The focus of this group is to lower stress, decrease anxiety, and improve mood. Status After Intervention:  Unchanged    Participation Level:  Active Listener and Interactive    Participation Quality: Appropriate and Attentive      Speech:  normal      Thought Process/Content: Logical      Affective Functioning: Congruent      Mood: Calm, Laughing, Smiling      Level of consciousness:  Alert and Attentive          Modes of Intervention: Activity and Movement      Discipline Responsible: Recreational Therapist      Shreyas Velez

## 2023-06-09 NOTE — BH NOTE
PT appeared to have slept 8 hrs thus far. Occasionally waking to toilet , no disruptions noted. Will continue to monitor.

## 2023-06-09 NOTE — DISCHARGE INSTRUCTIONS
Pt. Has a medication appointment with Esteban Mars on 6/19/23 at [address] [facility] [address] [facility], [address] [address]  Phone: [phone]    Regular diet as tolerated    Take medications as ordered

## 2023-06-09 NOTE — GROUP NOTE
Group Therapy Note    Date: 6/8/2023    Group Start Time: 1930  Group End Time: 2000  Group Topic: Medication    [facility], RN        Group Therapy Note    Attendees: 4       Patient's Goal:  Medication compliance. Status After Intervention:  Unchanged    Participation Level:  Active Listener    Participation Quality: Appropriate      Speech:  normal      Thought Process/Content: Logical      Affective Functioning: Congruent      Mood: euthymic      Level of consciousness:  Alert      Response to Learning: Able to retain information      Endings: None Reported    Modes of Intervention: Education and Support      Discipline Responsible: Registered Nurse      Signature:  Mani Real RN

## 2023-06-09 NOTE — PROGRESS NOTES
conducted an initial consultation and Spiritual Assessment for Quyen, who is a 32 y.o.,male. Patients Primary Language is: [place]. According to the patients EMR Jewish Affiliation is: [place]. The reason the Patient came to the hospital is:   Patient Active Problem List    Diagnosis Date Noted    Cocaine dependence, continuous ([facility] [address] 11/23/2015    Opioid type dependence, continuous ([facility] [address] 11/23/2015    Severe recurrent major depression without psychotic features ([facility] [address] 06/06/2023    ADD (attention deficit disorder) without hyperactivity 06/06/2023    Generalized anxiety disorder 11/23/2015        The  provided the following Interventions:  Initiated a relationship of care and support. Explored issues of garrick, belief, spirituality and Islam/ritual needs while hospitalized. Listened empathically. Provided chaplaincy education. Provided information about Spiritual Care Services. Offered prayer and assurance of continued prayers on patient's behalf. Chart reviewed. The following outcomes where achieved:  Patient shared limited information about both their medical narrative and spiritual journey/beliefs. Patient processed feeling about current hospitalization. Patient expressed gratitude for 's visit. Assessment:  Patient does not have any Islam/cultural needs that will affect patients preferences in health care. There are no spiritual or Islam issues which require intervention at this time. Plan:  Chaplains will continue to follow and will provide pastoral care on an as needed/requested basis.  recommends bedside caregivers page  on duty if patient shows signs of acute spiritual or emotional distress.       [address] [facility]   [phone]
[address]  Inpatient Progress Note     Date of Service: 06/08/23  Hospital Day: 2     Subjective/Interval History   06/08/23    Treatment Team Notes:  Notes reviewed and/or discussed and report that Shelton Bray is a patient recently admitted to the facility, attention invited to the dictated admission note which is self-explanatory. Patient interview: Shelton Bray was interviewed by this writer today. The patient continues to describe he is doing better. He indicated that he is very concerned about having to have the labs performed that I suggested for him to have drawn. Included a hepatic function panel, the same as a hep C antibody test, for completeness the TSH. The patient indicated that he would be willing to go doses in the outpatient however due to his history of intravenous drug use in the past, will be very difficult for him to have his blood drawn while he is in the facility. Objective     Vitals:    06/08/23 0800   BP: 132/84   Pulse: 72   Resp: 18   Temp: 97.1 °F (36.2 °C)   SpO2:      Vitals are stable    No results found for this or any previous visit (from the past 24 hour(s)). Mental Status Examination     Appearance/Hygiene 32 y.o.  White (non-) male  Hygiene: Much improved   Behavior/Social Relatedness Appropriate   Musculoskeletal Gait/Station: appropriate  Tone (flaccid, cogwheeling, spastic): not assessed  Psychomotor (hyperkinetic, hypokinetic): calm   Involuntary movements (tics, dyskinesias, akathisa, stereotypies): none   Speech   Rate, rhythm, volume, fluency and articulation are appropriate   Mood   Depression is improving   Affect    Congruent   Thought Process Linear and goal directed   Thought Content and Perceptual Disturbances Denies self-injurious behavior (SIB), suicidal ideation (SI), aggressive behavior or homicidal ideation (HI)    Denies auditory and visual hallucinations   Sensorium and Cognition  Grossly intact   Ins[facility]
GROUP THERAPY PROGRESS NOTE      Felipe Hyman was present for medication group. GROUP TIME: 30 minutes, Thursday 2pm    PERSONAL GOAL FOR PARTICIPATION: To be present for group, participate in discussion, and answer patient-directed questions. GOAL ORIENTATION: Personal    THERAPEUTIC INTERVENTIONS REVIEWED AND DISCUSSED: The following topics were presented: Therapeutic Interventions: storage of medications, how to remember to refill medications and keep up with doctor appointments, relapse prevention, keeping a record of all medication including prescription and non-prescription drugs, and who to contact with medication questions. Patients were given time to ask questions regarding their current therapy. IMPRESSION OF PARTICIPATION:    Patient attended session and was attentive and engaged.     Halbert Kussmaul, [facility]
Nutrition Note       Nutrition Recommendations/Plan:   Continue current diet as tolerated. Received referral r/t pt marked to be receiving home tube feeding or PN PTA. Pt currently with regular diet. Called unit to clarify- spoke with RN, screening entered incorrectly. Patient is only eating by mouth at baseline and does not require tube feedings at this time.      Electronically signed by Peggy Chamorro, [address] on 6/6/23 at 10:27 AM EDT    Contact: [phone]
Patient asking for his personal sneakers out of his personal  belonging that he wore in when admitted. Patient stated that even with the safety socks, his feet are always cold. His personal sneakers were given to him. The shoe strings were removed and placed back with his personal belongings before the sneakers were  given to patient.
Pt admitted to [place] from ED via wheel chair. . Pt alert and oriented x 4 , denies si at this point , but wanted to take an overdose he stated earlier as he was so unhappy. He feels better now as he is here for help. He has very high anxiety and depression , partially due to not receiving meds from assisted and being off of them . He has used [facility] in the past and also might use [facility]. He is positive for cocaine , which he stated he has used yesterday only in years. Pos for [facility] which he endorses using once or twice a week. He is recently released from assisted, after having had about 8 years in. He has a supportive mother with whom he is close , father . He has a 10th grade education and has no problem with reading or writing. He is heterosexual, with no wife or children. He does not smoke cigarettes or drink alcohol. He is pleasant cooperative and having anxiety and depression and was given vistaril to help with his anxiousness. He has mult tattoos and some bites on head and legs that are from fleas, he denies being exposed to bed bugs, but was around pets. Red marks are on foreams and a few small places on legs. Feet clear. Body swarm performed with BHT present. Pt already feels safe on unit and does not want to hurt self now and actually is euthymic with congruent affect. Rights explained and room directions given and encouragement to socialize with peers for max participation on unit. Cell phone locked up with police and no questions verbalized at this time. [facility] notified  that pt is on unit . RNS will initiate develop implement review or revise treatment plan.
Pt. Is visible in the milieu. He is meal, medication and group compliant. He is free from falls, self harm or harming others.
Received discharge order for discharge from hospital, patient in good condition, all paperwork signed and reviewed, copy and prescriptions given to patient, patient belongings verified by patient,  safety plan completed, patient escorted by [facility] staff to lobby to meet  transportation.
[facility]  Improving   Judgment Improving        Assessment/Plan      Psychiatric Diagnoses:   Patient Active Problem List   Diagnosis    Generalized anxiety disorder    Cocaine dependence, continuous (HCC)    Opioid type dependence, continuous (HCC)    Severe recurrent major depression without psychotic features ([facility] [address]    ADD (attention deficit disorder) without hyperactivity       Medical Diagnoses: Major depressive disorder recurrent without psychotic symptoms. Substance use disorder including opiate use currently in remission, cocaine moderate, cannabis moderate. Remote history of intravenous drug use. Psychosocial and contextual factors: See admission note    Level of impairment/disability: To be determined. 1.  Medications will continue the same for now. Same dose of Wellbutrin XL to be maintained for the next 3 days or so then to be increased to 300 mg every morning. 2.  Reviewed instructions, risks, benefits and side effects of medications  3. Disposition/Discharge Date: self-care/home, to be determined.     Shawna Shah MD, [address] [place] [address]  Psychiatry

## 2023-10-17 ENCOUNTER — HOSPITAL ENCOUNTER (EMERGENCY)
Facility: HOSPITAL | Age: 28
Discharge: HOME | End: 2023-10-17
Attending: EMERGENCY MEDICINE
Payer: MEDICAID

## 2023-10-17 ENCOUNTER — APPOINTMENT (OUTPATIENT)
Facility: HOSPITAL | Age: 28
End: 2023-10-17
Payer: MEDICAID

## 2023-10-17 VITALS
HEART RATE: 50 BPM | OXYGEN SATURATION: 94 % | WEIGHT: 160 LBS | BODY MASS INDEX: 24.25 KG/M2 | RESPIRATION RATE: 13 BRPM | TEMPERATURE: 97.5 F | SYSTOLIC BLOOD PRESSURE: 125 MMHG | HEIGHT: 68 IN | DIASTOLIC BLOOD PRESSURE: 67 MMHG

## 2023-10-17 DIAGNOSIS — M54.2 NECK PAIN: ICD-10-CM

## 2023-10-17 DIAGNOSIS — M54.6 THORACIC BACK PAIN, UNSPECIFIED BACK PAIN LATERALITY, UNSPECIFIED CHRONICITY: ICD-10-CM

## 2023-10-17 DIAGNOSIS — S09.90XA INJURY OF HEAD, INITIAL ENCOUNTER: Primary | ICD-10-CM

## 2023-10-17 DIAGNOSIS — S39.012A STRAIN OF LUMBAR REGION, INITIAL ENCOUNTER: ICD-10-CM

## 2023-10-17 LAB
ALBUMIN SERPL-MCNC: 4.1 G/DL (ref 3.4–5)
ALBUMIN/GLOB SERPL: 0.9 (ref 0.8–1.7)
ALP SERPL-CCNC: 119 U/L (ref 45–117)
ALT SERPL-CCNC: 51 U/L (ref 16–61)
ANION GAP SERPL CALC-SCNC: 3 MMOL/L (ref 3–18)
AST SERPL-CCNC: 43 U/L (ref 10–38)
BASOPHILS # BLD: 0 K/UL (ref 0–0.1)
BASOPHILS NFR BLD: 0 % (ref 0–2)
BILIRUB SERPL-MCNC: 0.7 MG/DL (ref 0.2–1)
BUN SERPL-MCNC: 8 MG/DL (ref 7–18)
BUN/CREAT SERPL: 10 (ref 12–20)
CALCIUM SERPL-MCNC: 9.8 MG/DL (ref 8.5–10.1)
CHLORIDE SERPL-SCNC: 102 MMOL/L (ref 100–111)
CO2 SERPL-SCNC: 29 MMOL/L (ref 21–32)
CREAT SERPL-MCNC: 0.78 MG/DL (ref 0.6–1.3)
DIFFERENTIAL METHOD BLD: ABNORMAL
EOSINOPHIL # BLD: 0 K/UL (ref 0–0.4)
EOSINOPHIL NFR BLD: 0 % (ref 0–5)
ERYTHROCYTE [DISTWIDTH] IN BLOOD BY AUTOMATED COUNT: 13.8 % (ref 11.6–14.5)
GLOBULIN SER CALC-MCNC: 4.7 G/DL (ref 2–4)
GLUCOSE SERPL-MCNC: 122 MG/DL (ref 74–99)
HCT VFR BLD AUTO: 43.5 % (ref 36–48)
HGB BLD-MCNC: 14.2 G/DL (ref 13–16)
IMM GRANULOCYTES # BLD AUTO: 0 K/UL (ref 0–0.04)
IMM GRANULOCYTES NFR BLD AUTO: 0 % (ref 0–0.5)
LYMPHOCYTES # BLD: 1 K/UL (ref 0.9–3.6)
LYMPHOCYTES NFR BLD: 8 % (ref 21–52)
MCH RBC QN AUTO: 30.5 PG (ref 24–34)
MCHC RBC AUTO-ENTMCNC: 32.6 G/DL (ref 31–37)
MCV RBC AUTO: 93.5 FL (ref 78–100)
MONOCYTES # BLD: 0.4 K/UL (ref 0.05–1.2)
MONOCYTES NFR BLD: 3 % (ref 3–10)
NEUTS SEG # BLD: 11.3 K/UL (ref 1.8–8)
NEUTS SEG NFR BLD: 89 % (ref 40–73)
NRBC # BLD: 0 K/UL (ref 0–0.01)
NRBC BLD-RTO: 0 PER 100 WBC
PLATELET # BLD AUTO: 386 K/UL (ref 135–420)
PMV BLD AUTO: 9.5 FL (ref 9.2–11.8)
POTASSIUM SERPL-SCNC: 4.2 MMOL/L (ref 3.5–5.5)
PROT SERPL-MCNC: 8.8 G/DL (ref 6.4–8.2)
RBC # BLD AUTO: 4.65 M/UL (ref 4.35–5.65)
SODIUM SERPL-SCNC: 134 MMOL/L (ref 136–145)
WBC # BLD AUTO: 12.7 K/UL (ref 4.6–13.2)

## 2023-10-17 PROCEDURE — 72125 CT NECK SPINE W/O DYE: CPT

## 2023-10-17 PROCEDURE — 85025 COMPLETE CBC W/AUTO DIFF WBC: CPT

## 2023-10-17 PROCEDURE — 80053 COMPREHEN METABOLIC PANEL: CPT

## 2023-10-17 PROCEDURE — 70450 CT HEAD/BRAIN W/O DYE: CPT

## 2023-10-17 PROCEDURE — 6360000004 HC RX CONTRAST MEDICATION: Performed by: EMERGENCY MEDICINE

## 2023-10-17 PROCEDURE — 6370000000 HC RX 637 (ALT 250 FOR IP): Performed by: EMERGENCY MEDICINE

## 2023-10-17 PROCEDURE — 99285 EMERGENCY DEPT VISIT HI MDM: CPT

## 2023-10-17 PROCEDURE — 71260 CT THORAX DX C+: CPT

## 2023-10-17 RX ORDER — ACETAMINOPHEN 325 MG/1
650 TABLET ORAL EVERY 4 HOURS PRN
Status: DISCONTINUED | OUTPATIENT
Start: 2023-10-17 | End: 2023-10-17 | Stop reason: HOSPADM

## 2023-10-17 RX ORDER — METHADONE HYDROCHLORIDE 10 MG/ML
85 CONCENTRATE ORAL
Status: COMPLETED | OUTPATIENT
Start: 2023-10-17 | End: 2023-10-17

## 2023-10-17 RX ADMIN — METHADONE HYDROCHLORIDE 85 MG: 10 CONCENTRATE ORAL at 13:32

## 2023-10-17 RX ADMIN — ACETAMINOPHEN 650 MG: 325 TABLET ORAL at 10:37

## 2023-10-17 RX ADMIN — IOPAMIDOL 80 ML: 612 INJECTION, SOLUTION INTRAVENOUS at 12:29

## 2023-10-17 ASSESSMENT — PAIN DESCRIPTION - LOCATION
LOCATION: NECK;BACK
LOCATION: BACK;HEAD

## 2023-10-17 ASSESSMENT — PAIN SCALES - GENERAL
PAINLEVEL_OUTOF10: 7
PAINLEVEL_OUTOF10: 6

## 2023-10-17 NOTE — ED NOTES
Patient last seen at clinic on 10/16/23 at 0931    Received 85mg daily    Spoke with Paulina Corona MD     [facility], [address] [place] [address]  10/17/23 8267

## 2023-10-17 NOTE — ED NOTES
Pt assessed by Md and given dc instructions. Pt verbalized understanding and had no other questions at this time.  IV removed and pt ambulatory out of ED with police officers      Delia Trevizo  10/17/23 1359

## 2023-10-17 NOTE — ED PROVIDER NOTES
Care Time (if necessary)     Medications   acetaminophen (TYLENOL) tablet 650 mg (650 mg Oral Given 10/17/23 1037)   methadone (DOLOPHINE) 10 MG/ML solution 85 mg (has no administration in time range)   iopamidol (ISOVUE-300) 61 % injection 100 mL (80 mLs IntraVENous Given 10/17/23 1229)           NARRATIVE:  Patient is receiving 85 mg of methadone daily. I will give a dose here. Patient is currently in custody. CT chest abdomen pelvis negative for acute injury. Will discharge for outpatient follow-up      Medical Decision Making         Final Diagnosis       ICD-10-CM    1. Injury of head, initial encounter  S09.90XA       2. Neck pain  M54.2       3. Strain of lumbar region, initial encounter  S39.012A       4. Thoracic back pain, unspecified back pain laterality, unspecified chronicity  M54.6               Disposition   dc      Kenia Gama MD  October 17, 2023    My signature above authenticates this document and my orders, the final    diagnosis (es), discharge prescription (s), and instructions in the Epic    record. Nursing notes have been reviewed by the physician/ advanced practice    Clinician.        Lyle Beltran MD  10/17/23 8858

## 2023-10-17 NOTE — ED NOTES
Pt requesting to have dose of methadone, MD needs clinic to verify    Paged Maximiliano, waiting on call back      [address]  10/17/23 8641
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
## 2024-05-07 NOTE — PLAN OF CARE
Problem: Self Harm/Suicidality  Goal: Will have no self-injury during hospital stay  Description: INTERVENTIONS:  1. Ensure constant observer at bedside with Q15M safety checks  2. Maintain a safe environment  3. Secure patient belongings  4. Ensure family/visitors adhere to safety recommendations  5. Ensure safety tray has been added to patient's diet order  6. Every shift and PRN: Re-assess suicidal risk via Frequent Screener    Outcome: Progressing     Problem: Safety - Adult  Goal: Free from fall injury  Outcome: Progressing      The patient has been free from fall or injury. He is alert and orientated to time, place and situation. He is visible on the unit. He attended group on the Adult Unit. He denies thoughts of harming self or others. There are no symptoms of withdrawal.  Continuing to monitor, and will continue to provide support. No

## 2024-06-17 ENCOUNTER — HOSPITAL ENCOUNTER (EMERGENCY)
Facility: HOSPITAL | Age: 29
Discharge: HOME | End: 2024-06-18
Attending: EMERGENCY MEDICINE
Payer: MEDICAID

## 2024-06-17 VITALS
HEIGHT: 68 IN | SYSTOLIC BLOOD PRESSURE: 126 MMHG | OXYGEN SATURATION: 98 % | DIASTOLIC BLOOD PRESSURE: 88 MMHG | HEART RATE: 82 BPM | RESPIRATION RATE: 18 BRPM | TEMPERATURE: 98.6 F | BODY MASS INDEX: 24.25 KG/M2 | WEIGHT: 160 LBS

## 2024-06-17 DIAGNOSIS — Z59.00 HOMELESSNESS: Primary | ICD-10-CM

## 2024-06-17 PROCEDURE — 99285 EMERGENCY DEPT VISIT HI MDM: CPT

## 2024-06-17 SDOH — ECONOMIC STABILITY - HOUSING INSECURITY: HOMELESSNESS UNSPECIFIED: Z59.00

## 2024-06-18 NOTE — DISCHARGE INSTRUCTIONS
SHELTERS:  [facility]  Open 6 PM to 6 AM  [phone]  Contact Meagan at [phone], during normal business hours, you must be cleared prior to attending the shelter.    [facility]  [phone]    [address]  [phone]

## 2024-06-18 NOTE — ED PROVIDER NOTES
EMERGENCY DEPARTMENT HISTORY AND PHYSICAL EXAM    12:20 AM EDT seen at this time in room 19        Date: 6/17/2024  Patient Name: Rhett Bal    History of Presenting Illness     Chief Complaint   Patient presents with    Suicidal         History Provided By: patient    Additional History (Context): Rhett Bal is a 28 y.o. male presents with triage note saying he is suicidal due to homelessness.  He says he is in some tough situations, is not aware of the shelter system, living [place], denies any acute medical complaint that needs to be addressed and not endorsing suicidal or homicidal ideation, linear thought process and no evidence of psychosis.    PCP: None, None    Chief Complaint:   Duration:    Timing:    Location:   Quality:   Severity:   Modifying Factors:   Associated Symptoms:       No current facility-administered medications for this encounter.     Current Outpatient Medications   Medication Sig Dispense Refill    hydrOXYzine pamoate (VISTARIL) 50 MG capsule Take 1 capsule by mouth every 6 hours as needed for Anxiety 30 capsule 0    buPROPion (WELLBUTRIN XL) 300 MG extended release tablet Take 1 tablet by mouth daily 15 tablet 1    traZODone (DESYREL) 50 MG tablet Take 1 tablet by mouth nightly as needed for Sleep 15 tablet 1       Past History     Past Medical History:  No past medical history on file.    Past Surgical History:  No past surgical history on file.    Family History:  No family history on file.    Social History:  Social History     Tobacco Use    Smoking status: Never     Passive exposure: Never    Smokeless tobacco: Never    Tobacco comments:     No smoking cigarettes   Substance Use Topics    Alcohol use: Never    Drug use: Yes     Frequency: 2.0 times per week     Types: Cocaine, Marijuana (Weed)     Comment: cocaine 1 x in years       Allergies:  No Known Allergies      Review of Systems     Review of Systems      Physical Exam       Patient Vitals for

## 2024-06-18 NOTE — ED NOTES
Patient received  to this writer on one to one observation status for safety of self  & others. An immediate room scan was performed in room and on patient's persons with no contrabands found. Patient was pleasant upon approach and request a meal in which was granted. Continue to provide one to one status and a safe and structured environment.